# Patient Record
Sex: FEMALE | Race: WHITE | NOT HISPANIC OR LATINO | URBAN - METROPOLITAN AREA
[De-identification: names, ages, dates, MRNs, and addresses within clinical notes are randomized per-mention and may not be internally consistent; named-entity substitution may affect disease eponyms.]

---

## 2024-04-29 ENCOUNTER — APPOINTMENT (OUTPATIENT)
Dept: RADIOLOGY | Facility: MEDICAL CENTER | Age: 59
End: 2024-04-29
Attending: EMERGENCY MEDICINE

## 2024-04-29 ENCOUNTER — HOSPITAL ENCOUNTER (EMERGENCY)
Facility: MEDICAL CENTER | Age: 59
End: 2024-04-30
Attending: EMERGENCY MEDICINE
Payer: OTHER MISCELLANEOUS

## 2024-04-29 DIAGNOSIS — S52.502A CLOSED FRACTURE OF DISTAL END OF LEFT RADIUS, UNSPECIFIED FRACTURE MORPHOLOGY, INITIAL ENCOUNTER: ICD-10-CM
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
## 2024-04-29 DIAGNOSIS — S52.612G CLOSED DISPLACED FRACTURE OF STYLOID PROCESS OF LEFT ULNA WITH DELAYED HEALING, SUBSEQUENT ENCOUNTER: ICD-10-CM

## 2024-04-29 DIAGNOSIS — F10.920 ALCOHOLIC INTOXICATION WITHOUT COMPLICATION (HCC): ICD-10-CM

## 2024-04-29 PROCEDURE — 29125 APPL SHORT ARM SPLINT STATIC: CPT

## 2024-04-29 PROCEDURE — 73060 X-RAY EXAM OF HUMERUS: CPT | Mod: LT

## 2024-04-29 PROCEDURE — 99284 EMERGENCY DEPT VISIT MOD MDM: CPT

## 2024-04-29 PROCEDURE — A9270 NON-COVERED ITEM OR SERVICE: HCPCS | Performed by: EMERGENCY MEDICINE

## 2024-04-29 PROCEDURE — 700102 HCHG RX REV CODE 250 W/ 637 OVERRIDE(OP): Performed by: EMERGENCY MEDICINE

## 2024-04-29 PROCEDURE — 73090 X-RAY EXAM OF FOREARM: CPT | Mod: LT

## 2024-04-29 PROCEDURE — 72125 CT NECK SPINE W/O DYE: CPT

## 2024-04-29 PROCEDURE — 302875 HCHG BANDAGE ACE 4 OR 6""

## 2024-04-29 PROCEDURE — 25605 CLTX DST RDL FX/EPHYS SEP W/: CPT

## 2024-04-29 PROCEDURE — 70450 CT HEAD/BRAIN W/O DYE: CPT

## 2024-04-29 PROCEDURE — 73110 X-RAY EXAM OF WRIST: CPT | Mod: LT

## 2024-04-29 PROCEDURE — 700111 HCHG RX REV CODE 636 W/ 250 OVERRIDE (IP): Mod: JZ | Performed by: EMERGENCY MEDICINE

## 2024-04-29 RX ORDER — ACETAMINOPHEN 325 MG/1
650 TABLET ORAL ONCE
Status: COMPLETED | OUTPATIENT
Start: 2024-04-29 | End: 2024-04-29

## 2024-04-29 RX ADMIN — ACETAMINOPHEN 650 MG: 325 TABLET, FILM COATED ORAL at 23:31

## 2024-04-30 VITALS
OXYGEN SATURATION: 98 % | BODY MASS INDEX: 17.42 KG/M2 | SYSTOLIC BLOOD PRESSURE: 120 MMHG | HEART RATE: 79 BPM | HEIGHT: 67 IN | DIASTOLIC BLOOD PRESSURE: 78 MMHG | TEMPERATURE: 98.2 F | RESPIRATION RATE: 18 BRPM | WEIGHT: 111 LBS

## 2024-04-30 NOTE — ED TRIAGE NOTES
Sarita Daley   58 y.o.     Chief Complaint   Patient presents with    Fall     Pt BIB [facility] from [facility] for falling down a flight of escalators. Pt states +head strike, +LOC, L hand pain with obvious deformity.          A&O x 4. Pt +ETOH, pt placed in c-collar since she states he hit her head. Denies any head or neck pain, no open lac or bleeding.     EMS interventions: 1000 mg tylenol IV    Vitals:    04/29/24 2103   BP: 117/66   Pulse: 70   Resp: 16   Temp: 36.8 °C (98.2 °F)   SpO2: 99%

## 2024-04-30 NOTE — ED PROVIDER NOTES
"ED Provider Note    CHIEF COMPLAINT  Chief Complaint   Patient presents with    Fall     Pt BIB [facility] from [facility] for falling down a flight of escalators. Pt states +head strike, +LOC, L hand pain with obvious deformity.          EXTERNAL RECORDS REVIEWED  Other none    HPI/ROS  LIMITATION TO HISTORY   Select: Intoxication  OUTSIDE HISTORIAN(S):  None    Sarita Daley is a 58 y.o. female who presents to the ER with primary complaint of left upper extremity pain after falling down escalator.  Currently visiting from [place] for a [employer] tournament.  After arriving to [facility] she admits to drinking relatively heavily.  While on her way to her hotel room for the evening she then fell down the escalator landing on her left upper extremity and outstretched fashion.  Did not hit her head.  No loss conscious.  No neck or back pain.  No chest or abdominal pain.  Focal pain to the left wrist.  No numbness or tingling.  No blood thinners.    PAST MEDICAL HISTORY       SURGICAL HISTORY  patient denies any surgical history    FAMILY HISTORY  History reviewed. No pertinent family history.    SOCIAL HISTORY  Social History     Tobacco Use    Smoking status: Every Day     Types: Cigarettes    Smokeless tobacco: Never   Substance and Sexual Activity    Alcohol use: Yes    Drug use: Never    Sexual activity: Not on file       CURRENT MEDICATIONS  Home Medications    **Home medications have not yet been reviewed for this encounter**         ALLERGIES  Allergies   Allergen Reactions    Penicillins        PHYSICAL EXAM  VITAL SIGNS: /78   Pulse 79   Temp 36.8 °C (98.2 °F) (Temporal)   Resp 18   Ht 1.702 m (5' 7\")   Wt 50.3 kg (111 lb)   SpO2 98%   BMI 17.39 kg/m²      Pulse ox interpretation: I interpret this pulse ox as normal.  Constitutional: Alert in no apparent distress.  HENT: No signs of trauma, Bilateral external ears normal, Nose normal.   Eyes: Pupils are equal and reactive   Neck: Normal range of " motion, No tenderness, Supple  Cardiovascular: Regular rate and rhythm, no murmurs.   Thorax & Lungs: Normal breath sounds, No respiratory distress, No wheezing, No chest tenderness.   Abdomen: Bowel sounds normal, Soft, No tenderness, No masses, No pulsatile masses. No peritoneal signs.  Skin: Warm, Dry, No erythema, No rash.   Back: No bony tenderness, No CVA tenderness.   Extremities: Left upper extremity: Under clavicle, shoulder.  Mild tenderness over the mid arm.  Additional tenderness diffusely to the wrist and slight soft tissue swelling to the lateral dorsal hand.  Musculoskeletal: Good range of motion in all major joints. No tenderness to palpation or major deformities noted.   Neurologic: Alert , awake.  GCS 15.  Slurred speech consistent with her report of alcohol intake          RADIOLOGY/PROCEDURES   I have independently interpreted the diagnostic imaging associated with this visit and am waiting the final reading from the radiologist.   My preliminary interpretation is as follows: Distal radius ulnar fracture noted    Radiologist interpretation:  CT-HEAD W/O   Final Result         1.  No acute intracranial abnormality.   2.  Left maxillary sinusitis changes   3.  Atherosclerosis.         DX-WRIST-COMPLETE 3+ LEFT   Final Result         1.  Comminuted impacted distal radial fracture   2.  Distal ulnar metaphyseal fracture extending through the ulnar styloid base      DX-FOREARM LEFT   Final Result         1.  Comminuted impacted distal radial fracture   2.  Distal ulnar metaphyseal fracture extending through the ulnar styloid base      DX-HUMERUS 2+ LEFT   Final Result         1.  No acute traumatic bony injury.      CT-CSPINE WITHOUT PLUS RECONS   Final Result         1.  No acute traumatic bony injury of the cervical spine is apparent.   2.  Atherosclerosis   3.  Left maxillary sinusitis changes.        Splint placement and orthopedic reduction: During sugar-tong splint placement I was able to place  some pressure on the distal radius and attempt to partially reduce the dorsal angulation.  Patient tolerated well.  No complications.  Neurovascular motor intact with before and after the procedure.      COURSE & MEDICAL DECISION MAKING    ASSESSMENT, COURSE AND PLAN  Care Narrative: 58-year-old presenting after mechanical fall.  Given her intoxicated state.  Will CT head and neck.  Will additionally focused x-ray imaging on left upper extremity given her primary pain complaint as stated above    DISPOSITION AND DISCUSSIONS  I have discussed management of the patient with the following physicians and EDU's: Dr. Sevilla on-call for Ortho    Discussion of management with other QHP or appropriate source(s): None     Escalation of care considered, and ultimately not performed:Laboratory analysis and acute inpatient care management, however at this time, the patient is most appropriate for outpatient management    Barriers to care at this time, including but not limited to:  Visiting from out of town .   58-year-old female presenting to the [facility] part with above presentation.  CT imaging negative.  X-ray imaging positive as above.  I have consult with Ortho.  With assistance of tech staff I have placed a sugar-tong splint.  Patient tolerated well.  Neurovascular evaluation appropriate both before and after splint placement.  She has been provided with additional sling.  She is understanding return precautions here to the ER and otherwise will follow-up with her orthopedics once home.    FINAL DIAGNOSIS  1. Fall, initial encounter    2. Closed fracture of distal end of left radius, unspecified fracture morphology, initial encounter    3. Closed displaced fracture of styloid process of left ulna with delayed healing, subsequent encounter    4. Alcoholic intoxication without complication (HCC)           Electronically signed by: Hadley Poole M.D., 4/29/2024 9:32 PM